# Patient Record
Sex: FEMALE | URBAN - METROPOLITAN AREA
[De-identification: names, ages, dates, MRNs, and addresses within clinical notes are randomized per-mention and may not be internally consistent; named-entity substitution may affect disease eponyms.]

---

## 2024-01-16 ENCOUNTER — INPATIENT (INPATIENT)
Facility: HOSPITAL | Age: 79
LOS: 0 days | Discharge: HOME | DRG: 57 | End: 2024-01-16
Attending: STUDENT IN AN ORGANIZED HEALTH CARE EDUCATION/TRAINING PROGRAM | Admitting: STUDENT IN AN ORGANIZED HEALTH CARE EDUCATION/TRAINING PROGRAM
Payer: SELF-PAY

## 2024-01-16 VITALS
HEART RATE: 68 BPM | RESPIRATION RATE: 15 BRPM | TEMPERATURE: 98 F | SYSTOLIC BLOOD PRESSURE: 145 MMHG | OXYGEN SATURATION: 100 % | DIASTOLIC BLOOD PRESSURE: 78 MMHG

## 2024-01-16 VITALS
HEART RATE: 94 BPM | SYSTOLIC BLOOD PRESSURE: 155 MMHG | DIASTOLIC BLOOD PRESSURE: 89 MMHG | OXYGEN SATURATION: 98 % | TEMPERATURE: 98 F | RESPIRATION RATE: 18 BRPM

## 2024-01-16 DIAGNOSIS — G31.83 NEUROCOGNITIVE DISORDER WITH LEWY BODIES: ICD-10-CM

## 2024-01-16 DIAGNOSIS — R45.851 SUICIDAL IDEATIONS: ICD-10-CM

## 2024-01-16 DIAGNOSIS — Z29.9 ENCOUNTER FOR PROPHYLACTIC MEASURES, UNSPECIFIED: ICD-10-CM

## 2024-01-16 DIAGNOSIS — R45.1 RESTLESSNESS AND AGITATION: ICD-10-CM

## 2024-01-16 LAB
ALBUMIN SERPL ELPH-MCNC: 4.4 G/DL — SIGNIFICANT CHANGE UP (ref 3.3–5)
ALP SERPL-CCNC: 52 U/L — SIGNIFICANT CHANGE UP (ref 40–120)
ALT FLD-CCNC: 26 U/L — SIGNIFICANT CHANGE UP (ref 10–45)
AMPHET UR-MCNC: NEGATIVE — SIGNIFICANT CHANGE UP
ANION GAP SERPL CALC-SCNC: 8 MMOL/L — SIGNIFICANT CHANGE UP (ref 5–17)
APAP SERPL-MCNC: <15 UG/ML — SIGNIFICANT CHANGE UP (ref 10–30)
APPEARANCE UR: ABNORMAL
AST SERPL-CCNC: 29 U/L — SIGNIFICANT CHANGE UP (ref 10–40)
BACTERIA # UR AUTO: NEGATIVE /HPF — SIGNIFICANT CHANGE UP
BARBITURATES UR SCN-MCNC: NEGATIVE — SIGNIFICANT CHANGE UP
BASOPHILS # BLD AUTO: 0.11 K/UL — SIGNIFICANT CHANGE UP (ref 0–0.2)
BASOPHILS NFR BLD AUTO: 1.2 % — SIGNIFICANT CHANGE UP (ref 0–2)
BENZODIAZ UR-MCNC: NEGATIVE — SIGNIFICANT CHANGE UP
BILIRUB SERPL-MCNC: 0.4 MG/DL — SIGNIFICANT CHANGE UP (ref 0.2–1.2)
BILIRUB UR-MCNC: NEGATIVE — SIGNIFICANT CHANGE UP
BUN SERPL-MCNC: 24 MG/DL — HIGH (ref 7–23)
CALCIUM SERPL-MCNC: 9.8 MG/DL — SIGNIFICANT CHANGE UP (ref 8.4–10.5)
CAST: 0 /LPF — SIGNIFICANT CHANGE UP (ref 0–4)
CHLORIDE SERPL-SCNC: 106 MMOL/L — SIGNIFICANT CHANGE UP (ref 96–108)
CO2 SERPL-SCNC: 27 MMOL/L — SIGNIFICANT CHANGE UP (ref 22–31)
COCAINE METAB.OTHER UR-MCNC: NEGATIVE — SIGNIFICANT CHANGE UP
COLOR SPEC: YELLOW — SIGNIFICANT CHANGE UP
CREAT SERPL-MCNC: 0.9 MG/DL — SIGNIFICANT CHANGE UP (ref 0.5–1.3)
DIFF PNL FLD: ABNORMAL
EGFR: 65 ML/MIN/1.73M2 — SIGNIFICANT CHANGE UP
EOSINOPHIL # BLD AUTO: 0.14 K/UL — SIGNIFICANT CHANGE UP (ref 0–0.5)
EOSINOPHIL NFR BLD AUTO: 1.5 % — SIGNIFICANT CHANGE UP (ref 0–6)
ETHANOL SERPL-MCNC: <10 MG/DL — SIGNIFICANT CHANGE UP (ref 0–10)
FLUAV AG NPH QL: SIGNIFICANT CHANGE UP
FLUBV AG NPH QL: SIGNIFICANT CHANGE UP
GLUCOSE SERPL-MCNC: 120 MG/DL — HIGH (ref 70–99)
GLUCOSE UR QL: NEGATIVE MG/DL — SIGNIFICANT CHANGE UP
HCT VFR BLD CALC: 37.6 % — SIGNIFICANT CHANGE UP (ref 34.5–45)
HGB BLD-MCNC: 12.5 G/DL — SIGNIFICANT CHANGE UP (ref 11.5–15.5)
IMM GRANULOCYTES NFR BLD AUTO: 0.4 % — SIGNIFICANT CHANGE UP (ref 0–0.9)
KETONES UR-MCNC: NEGATIVE MG/DL — SIGNIFICANT CHANGE UP
LEUKOCYTE ESTERASE UR-ACNC: ABNORMAL
LYMPHOCYTES # BLD AUTO: 2.44 K/UL — SIGNIFICANT CHANGE UP (ref 1–3.3)
LYMPHOCYTES # BLD AUTO: 26.4 % — SIGNIFICANT CHANGE UP (ref 13–44)
MCHC RBC-ENTMCNC: 31.5 PG — SIGNIFICANT CHANGE UP (ref 27–34)
MCHC RBC-ENTMCNC: 33.2 GM/DL — SIGNIFICANT CHANGE UP (ref 32–36)
MCV RBC AUTO: 94.7 FL — SIGNIFICANT CHANGE UP (ref 80–100)
METHADONE UR-MCNC: NEGATIVE — SIGNIFICANT CHANGE UP
MONOCYTES # BLD AUTO: 0.71 K/UL — SIGNIFICANT CHANGE UP (ref 0–0.9)
MONOCYTES NFR BLD AUTO: 7.7 % — SIGNIFICANT CHANGE UP (ref 2–14)
NEUTROPHILS # BLD AUTO: 5.8 K/UL — SIGNIFICANT CHANGE UP (ref 1.8–7.4)
NEUTROPHILS NFR BLD AUTO: 62.8 % — SIGNIFICANT CHANGE UP (ref 43–77)
NITRITE UR-MCNC: NEGATIVE — SIGNIFICANT CHANGE UP
NRBC # BLD: 0 /100 WBCS — SIGNIFICANT CHANGE UP (ref 0–0)
OPIATES UR-MCNC: NEGATIVE — SIGNIFICANT CHANGE UP
OXYCODONE UR-MCNC: NEGATIVE — SIGNIFICANT CHANGE UP
PCP SPEC-MCNC: SIGNIFICANT CHANGE UP
PCP UR-MCNC: NEGATIVE — SIGNIFICANT CHANGE UP
PH UR: 8 — SIGNIFICANT CHANGE UP (ref 5–8)
PLATELET # BLD AUTO: 306 K/UL — SIGNIFICANT CHANGE UP (ref 150–400)
POTASSIUM SERPL-MCNC: 3.6 MMOL/L — SIGNIFICANT CHANGE UP (ref 3.5–5.3)
POTASSIUM SERPL-SCNC: 3.6 MMOL/L — SIGNIFICANT CHANGE UP (ref 3.5–5.3)
PROT SERPL-MCNC: 6.9 G/DL — SIGNIFICANT CHANGE UP (ref 6–8.3)
PROT UR-MCNC: NEGATIVE MG/DL — SIGNIFICANT CHANGE UP
RBC # BLD: 3.97 M/UL — SIGNIFICANT CHANGE UP (ref 3.8–5.2)
RBC # FLD: 11.9 % — SIGNIFICANT CHANGE UP (ref 10.3–14.5)
RBC CASTS # UR COMP ASSIST: 6 /HPF — HIGH (ref 0–4)
RSV RNA NPH QL NAA+NON-PROBE: SIGNIFICANT CHANGE UP
SALICYLATES SERPL-MCNC: <2 MG/DL — LOW (ref 15–30)
SARS-COV-2 RNA SPEC QL NAA+PROBE: SIGNIFICANT CHANGE UP
SODIUM SERPL-SCNC: 141 MMOL/L — SIGNIFICANT CHANGE UP (ref 135–145)
SP GR SPEC: 1.01 — SIGNIFICANT CHANGE UP (ref 1–1.03)
SQUAMOUS # UR AUTO: 1 /HPF — SIGNIFICANT CHANGE UP (ref 0–5)
THC UR QL: NEGATIVE — SIGNIFICANT CHANGE UP
UROBILINOGEN FLD QL: 0.2 MG/DL — SIGNIFICANT CHANGE UP (ref 0.2–1)
WBC # BLD: 9.24 K/UL — SIGNIFICANT CHANGE UP (ref 3.8–10.5)
WBC # FLD AUTO: 9.24 K/UL — SIGNIFICANT CHANGE UP (ref 3.8–10.5)
WBC UR QL: 3 /HPF — SIGNIFICANT CHANGE UP (ref 0–5)

## 2024-01-16 PROCEDURE — 99222 1ST HOSP IP/OBS MODERATE 55: CPT

## 2024-01-16 PROCEDURE — 99053 MED SERV 10PM-8AM 24 HR FAC: CPT

## 2024-01-16 PROCEDURE — 85025 COMPLETE CBC W/AUTO DIFF WBC: CPT

## 2024-01-16 PROCEDURE — 87637 SARSCOV2&INF A&B&RSV AMP PRB: CPT

## 2024-01-16 PROCEDURE — 87086 URINE CULTURE/COLONY COUNT: CPT

## 2024-01-16 PROCEDURE — 99223 1ST HOSP IP/OBS HIGH 75: CPT

## 2024-01-16 PROCEDURE — 80053 COMPREHEN METABOLIC PANEL: CPT

## 2024-01-16 PROCEDURE — 99285 EMERGENCY DEPT VISIT HI MDM: CPT

## 2024-01-16 PROCEDURE — 99285 EMERGENCY DEPT VISIT HI MDM: CPT | Mod: 25

## 2024-01-16 PROCEDURE — 80307 DRUG TEST PRSMV CHEM ANLYZR: CPT

## 2024-01-16 PROCEDURE — 81001 URINALYSIS AUTO W/SCOPE: CPT

## 2024-01-16 RX ORDER — ONDANSETRON 8 MG/1
4 TABLET, FILM COATED ORAL EVERY 8 HOURS
Refills: 0 | Status: DISCONTINUED | OUTPATIENT
Start: 2024-01-16 | End: 2024-01-16

## 2024-01-16 RX ORDER — ACETAMINOPHEN 500 MG
650 TABLET ORAL EVERY 6 HOURS
Refills: 0 | Status: DISCONTINUED | OUTPATIENT
Start: 2024-01-16 | End: 2024-01-16

## 2024-01-16 RX ORDER — QUETIAPINE FUMARATE 200 MG/1
1 TABLET, FILM COATED ORAL
Refills: 0 | DISCHARGE

## 2024-01-16 RX ORDER — LANOLIN ALCOHOL/MO/W.PET/CERES
3 CREAM (GRAM) TOPICAL AT BEDTIME
Refills: 0 | Status: DISCONTINUED | OUTPATIENT
Start: 2024-01-16 | End: 2024-01-16

## 2024-01-16 NOTE — H&P ADULT - PROBLEM SELECTOR PLAN 1
constant observation  no apparent underlying medical reasons for psychosis  Psychiatry consulted, follow up recommendations  no contraindications to psychiatric admission from a medical standpoint

## 2024-01-16 NOTE — DISCHARGE NOTE NURSING/CASE MANAGEMENT/SOCIAL WORK - NSDCPEFALRISK_GEN_ALL_CORE
For information on Fall & Injury Prevention, visit: [email or web address] OR  [email or web address] OR  https://www.cdc.gov/steadi/patient.html

## 2024-01-16 NOTE — BH CONSULTATION LIAISON ASSESSMENT NOTE - RISK ASSESSMENT
Patient does not meet criteria for inpatient psychiatry admission.   Daughter does not have acute safety concerns, plans to keep patients away from sharps,  and she prefers mother to be cared for at home.   Patient should follow up with outpatient psych - requires assistance with psych referral.   Social work to follow up with safe discharge planning.  elevated risk 2/2 acute/chronic cognitive impairments, no e/o suicidality or homicidality at this time, does not meet criteria for psychiatric admission
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
## 2024-01-16 NOTE — ED PROVIDER NOTE - DIFFERENTIAL DIAGNOSIS
Differential Diagnosis Ddx includes, however, is not limited to: dementia, delirium, psychiatric d/o, other

## 2024-01-16 NOTE — DISCHARGE NOTE NURSING/CASE MANAGEMENT/SOCIAL WORK - PATIENT PORTAL LINK FT
You can access the FollowMyHealth Patient Portal offered by [facility] by registering at the following website: [email or web address]. By joining [employer]’s FollowMyHealth portal, you will also be able to view your health information using other applications (apps) compatible with our system.

## 2024-01-16 NOTE — DISCHARGE NOTE PROVIDER - NSDCCPCAREPLAN_GEN_ALL_CORE_FT
PRINCIPAL DISCHARGE DIAGNOSIS  Diagnosis: Agitation  Assessment and Plan of Treatment: You were brought in for increased symptoms of agitation and were seen by psychiatry, you have been deemed safe to return home with family. Please continue Seroquel and follow up with [facility] [phone]

## 2024-01-16 NOTE — BH CONSULTATION LIAISON ASSESSMENT NOTE - NSBHCHARTREVIEWVS_PSY_A_CORE FT
Vital Signs Last 24 Hrs  T(C): 36.6 (16 Jan 2024 09:53), Max: 36.6 (16 Jan 2024 03:21)  T(F): 97.9 (16 Jan 2024 09:53), Max: 97.9 (16 Jan 2024 03:21)  HR: 94 (16 Jan 2024 09:53) (94 - 94)  BP: 155/89 (16 Jan 2024 09:53) (155/89 - 155/89)  BP(mean): 111 (16 Jan 2024 09:53) (111 - 111)  RR: 18 (16 Jan 2024 09:53) (18 - 18)  SpO2: 98% (16 Jan 2024 09:53) (98% - 98%)    Parameters below as of 16 Jan 2024 09:53  Patient On (Oxygen Delivery Method): room air

## 2024-01-16 NOTE — BH CONSULTATION LIAISON ASSESSMENT NOTE - NSBHCONSULTRECOMMENDOTHER_PSY_A_CORE FT
1. If pt to remain in hospital, increase Seroquel to 25mg PO daily and 50mg PO qHS - if being d/c, may f/u with own provider for med adjustment.    2. PRN: Seroquel 25mg PO q6h PRN agitation; Haldol 0.5mg IV q6h PRN severe agitation.  Monitor for QTc<500.  Melatonin 3mg PO qHS PRN insomnia.    3. Check: TSH, B12, folate, RPR     4. Minimize use of benzos, opioids, anticholinergics, or other deliriogenic agents when possible.  Maintain sleep wake cycle.  Provide frequent reorientation and redirection.  Family member at bedside if possible. Assess for need for glasses and hearing aid (if applicable).    5. Pt does not meet criteria for psychiatric hospitalization.  Recommend outpt psych f/u at [facility] after d/c- [phone].   [facility] will follow.   1. If pt to remain in hospital, increase Seroquel to 25mg PO daily and 50mg PO qHS - if being d/c, may f/u with own provider for med adjustment.    2. PRN: Seroquel 25mg PO q6h PRN agitation; Haldol 0.5mg IV q6h PRN severe agitation.  Monitor for QTc<500.  Melatonin 3mg PO qHS PRN insomnia.    3. Check: TSH, B12, folate, RPR     4. Minimize use of benzos, opioids, anticholinergics, or other deliriogenic agents when possible.  Maintain sleep wake cycle.  Provide frequent reorientation and redirection.  Family member at bedside if possible. Assess for need for glasses and hearing aid (if applicable).    5. Pt does not meet criteria for psychiatric hospitalization.  Recommend outpt psych f/u at [facility] after d/c- [phone].   [facility] will follow.

## 2024-01-16 NOTE — BH CONSULTATION LIAISON ASSESSMENT NOTE - NSBHATTESTCOMMENTATTENDFT_PSY_A_CORE
78F living with daughter, with PPHx LB dementia, no prior SA or psych admissions, no active substance abuse, with no significant PMH bib EMS for agitation at home, threatening with scissor, psychiatry consulted for management of agitation and to evaluate for SI/HI.  Pt AOx1 on interview, seen via [employer]  ID# [id number].  Pt states she does not recall making SI/Hi statements or gesturing with scissor; states she thinks her family wants her to stay in the hospital and she does not know why.  Denies depression, anxiety, SI/HI or AVH.  Denies KEVON.  Denies access to guns.  Presently calm and in fair behavioral control, although with labile affect.  Collateral from dtr denies c/f suicidality or homicidality.  Dx: Dementia with behavioral disturbances.  Recs: Seroquel adjustment as above if pt staying in hospital, SW for safe d/c planning and referral resources.  Agree with trainee's assessment and plan as above. 78F living with daughter, with PPHx LB dementia, no prior SA or psych admissions, no active substance abuse, with no significant PMH bib EMS for agitation at home, threatening with scissor, psychiatry consulted for management of agitation and to evaluate for SI/HI.  Pt AOx1 on interview, seen via [employer]  ID# [id number].  Pt states she does not recall making SI/Hi statements or gesturing with scissor; states she thinks her family wants her to stay in the hospital and she does not know why.  Denies depression, anxiety, SI/HI or AVH.  Denies KEVON.  Denies access to guns.  Presently calm and in fair behavioral control, although with labile affect.  Collateral from dtr denies c/f suicidality or homicidality.  Dx: Dementia with behavioral disturbances.  Recs: Seroquel adjustment as above if pt staying in hospital, SW for safe d/c planning and referral resources.  Agree with trainee's assessment and plan as above.

## 2024-01-16 NOTE — ED ADULT NURSE NOTE - NSFALLUNIVINTERV_ED_ALL_ED
Call bell, personal items and telephone in reach/Non-slip footwear applied when patient is off stretcher/Physically safe environment - no spills, clutter or unnecessary equipment/Purposeful proactive rounding
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
## 2024-01-16 NOTE — BH CONSULTATION LIAISON ASSESSMENT NOTE - ORIENTATION
does not remember psychotic episode as recalled by daughter  knows she is in the hospital, knows her name

## 2024-01-16 NOTE — ED PROVIDER NOTE - CHIEF COMPLAINT
The patient is a 78y Female complaining of suicidal thoughts. The patient is a 78y Female complaining of agitation
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
## 2024-01-16 NOTE — DISCHARGE NOTE PROVIDER - NSFOLLOWUPCLINICS_GEN_ALL_ED_FT
[facility]  Psychiatry  [address] [place] [address]  Phone: [phone]  Fax:   Follow Up Time: 1 week

## 2024-01-16 NOTE — BH CONSULTATION LIAISON ASSESSMENT NOTE - NSSUICPROTFACT_PSY_ALL_CORE
Responsibility to children, family, or others/Supportive social network of family or friends Responsibility to children, family, or others/Supportive social network of family or friends/Positive therapeutic relationships

## 2024-01-16 NOTE — BH CONSULTATION LIAISON ASSESSMENT NOTE - NSBHCHARTREVIEWLAB_PSY_A_CORE FT
12.5   9.24  )-----------( 306      ( 2024 04:16 )             37.6     01-16    141  |  106  |  24<H>  ----------------------------<  120<H>  3.6   |  27  |  0.90    Ca    9.8      2024 04:16    TPro  6.9  /  Alb  4.4  /  TBili  0.4  /  DBili  x   /  AST  29  /  ALT  26  /  AlkPhos  52  -16    Urinalysis Basic - ( 2024 04:24 )    Color: Yellow / Appearance: Cloudy / S.010 / pH: x  Gluc: x / Ketone: Negative mg/dL  / Bili: Negative / Urobili: 0.2 mg/dL   Blood: x / Protein: Negative mg/dL / Nitrite: Negative   Leuk Esterase: Trace / RBC: 6 /HPF / WBC 3 /HPF   Sq Epi: x / Non Sq Epi: 1 /HPF / Bacteria: Negative /HPF

## 2024-01-16 NOTE — DISCHARGE NOTE PROVIDER - CARE PROVIDER_API CALL
[facility],   [address] [facility] [address]  Phone: [phone]  Fax: (   )    -  Follow Up Time:

## 2024-01-16 NOTE — ED PROVIDER NOTE - CLINICAL SUMMARY MEDICAL DECISION MAKING FREE TEXT BOX
Tiffanie Sanabria DO PGY-3  HPI:   78-year-old female with history of Lewy body dementia, Alzheimer's disease presents to the ED brought in by daughter/EMS because of episode of agitation at home.  Patient reports that patient took her usual 5 mg dose of Seroquel at night and then woke up at 2 AM today patient threatening only holding scissors.  Patient's daughter feels that patient has had increased agitated episodes at home and has had increasing difficulty taking care of her.  Patient was recently treated for UTI with Macrobid, status post antibiotics.    ROS:   Denies fever, chills, chest pain, shortness of breath, abdominal pain, nausea, vomiting, diarrhea, dysuria, hematuria    PHYSICAL EXAM:  CONSTITUTIONAL: Well appearing, awake, alert, oriented to person, place, time/situation and in no apparent distress.  HEAD: Atraumatic, no step offs.  NECK: Supple, no meningismus.   EYES: Clear bilaterally, pupils equal, round and reactive to light.  ENMT: Airway patent, Nasal mucosa clear. Mouth with normal mucosa. Uvula is midline.   CARDIAC: Normal rate, regular rhythm. +S1/S2. No murmurs, rubs or gallops.  RESPIRATORY: Breathing unlabored. Breath sounds clear and equal bilaterally.  ABDOMEN:  Soft, nontender, nondistended. No rebound tenderness or guarding.  FLANK: No CVA tenderness B/L.  NEUROLOGICAL: Alert and oriented, no focal deficits, no motor or sensory deficits. Ambulatory in ER with steady gait.  MSK: No clubbing, cyanosis, or edema.   SKIN: Skin warm and dry.     MDM:   70-year-old female with history of Lewy body dementia presents to the ED with increasing agitation  Differential includes but is not limited to neurocognitive disease progression, hematologic or electrolyte abnormalities, precipitating infection,  Plan to obtain labs, urinalysis, EKG, and reassess.  Given family no longer comfortable taking care of at home patient will likely require admission. Tiffanie Sanabria DO PGY-3  HPI:   78-year-old female with history of Lewy body dementia, Alzheimer's disease presents to the ED brought in by daughter/EMS because of episode of agitation at home.  Patient reports that patient took her usual 5 mg dose of Seroquel at night and then woke up at 2 AM today patient threatening only holding scissors.  Patient's daughter feels that patient has had increased agitated episodes at home and has had increasing difficulty taking care of her.  Patient was recently treated for UTI with Macrobid, status post antibiotics.    ROS:   Denies fever, chills, chest pain, shortness of breath, abdominal pain, nausea, vomiting, diarrhea, dysuria, hematuria    PHYSICAL EXAM:  CONSTITUTIONAL: Well appearing, awake, alert, oriented to person, place, time/situation and in no apparent distress.  HEAD: Atraumatic, no step offs.  NECK: Supple, no meningismus.   EYES: Clear bilaterally, pupils equal, round and reactive to light.  ENMT: Airway patent, Nasal mucosa clear. Mouth with normal mucosa. Uvula is midline.   CARDIAC: Normal rate, regular rhythm. +S1/S2. No murmurs, rubs or gallops.  RESPIRATORY: Breathing unlabored. Breath sounds clear and equal bilaterally.  ABDOMEN:  Soft, nontender, nondistended. No rebound tenderness or guarding.  FLANK: No CVA tenderness B/L.  NEUROLOGICAL: Alert and oriented, no focal deficits, no motor or sensory deficits. Ambulatory in ER with steady gait.  MSK: No clubbing, cyanosis, or edema.   SKIN: Skin warm and dry.     MDM:   70-year-old female with history of Lewy body dementia presents to the ED with increasing agitation  Differential includes but is not limited to neurocognitive disease progression, hematologic or electrolyte abnormalities, precipitating infection,  Plan to obtain labs, urinalysis, EKG, and reassess.  Given family no longer comfortable taking care of at home patient will likely require admission.    JESS Oliver MD: Agree with resident/ACP MDM, assessment and plan as above.

## 2024-01-16 NOTE — BH CONSULTATION LIAISON ASSESSMENT NOTE - CURRENT MEDICATION
MEDICATIONS  (STANDING):    MEDICATIONS  (PRN):  acetaminophen     Tablet .. 650 milliGRAM(s) Oral every 6 hours PRN Temp greater or equal to 38C (100.4F), Mild Pain (1 - 3)  aluminum hydroxide/magnesium hydroxide/simethicone Suspension 30 milliLiter(s) Oral every 4 hours PRN Dyspepsia  melatonin 3 milliGRAM(s) Oral at bedtime PRN Insomnia  ondansetron Injectable 4 milliGRAM(s) IV Push every 8 hours PRN Nausea and/or Vomiting

## 2024-01-16 NOTE — ED ADULT NURSE REASSESSMENT NOTE - NS ED NURSE REASSESS COMMENT FT1
Pt daughter at bedside states mother is acting normal and requesting to go home. ACP Cindy Reyes contacted states she will come see pt in ED for evaluation.

## 2024-01-16 NOTE — DISCHARGE NOTE PROVIDER - PROVIDER TOKENS
FREE:[LAST:[[facility]],PHONE:[phone]],FAX:[(   )    -],ADDRESS:[[address] [place] [address]]]

## 2024-01-16 NOTE — BH CONSULTATION LIAISON ASSESSMENT NOTE - NSBHCHARTREVIEWINVESTIGATE_PSY_A_CORE FT
< from: 12 Lead ECG (01.16.24 @ 03:20) >      Ventricular Rate 70 BPM    Atrial Rate 70 BPM    P-R Interval 202 ms    QRS Duration 84 ms    Q-T Interval 388 ms    QTC Calculation(Bazett) 419 ms    P Axis 7 degrees    R Axis 9 degrees    T Axis 1 degrees    Diagnosis Line NORMAL SINUS RHYTHM  POSSIBLE LEFT ATRIAL ENLARGEMENT  SEPTAL INFARCT , AGE UNDETERMINED  CANNOT RULE OUT INFERIOR INFARCT , AGE UNDETERMINED  ABNORMAL ECG  NO PREVIOUS ECGS AVAILABLE  Confirmed by DAT Hernandez Zlata ([id number]) on 1/16/2024 5:16:17 PM    < end of copied text >     < from: 12 Lead ECG (01.16.24 @ 03:20) >      Ventricular Rate 70 BPM    Atrial Rate 70 BPM    P-R Interval 202 ms    QRS Duration 84 ms    Q-T Interval 388 ms    QTC Calculation(Bazett) 419 ms    P Axis 7 degrees    R Axis 9 degrees    T Axis 1 degrees    Diagnosis Line NORMAL SINUS RHYTHM  POSSIBLE LEFT ATRIAL ENLARGEMENT  SEPTAL INFARCT , AGE UNDETERMINED  CANNOT RULE OUT INFERIOR INFARCT , AGE UNDETERMINED  ABNORMAL ECG  NO PREVIOUS ECGS AVAILABLE  Confirmed by DAT Hernandez Zlata ([id number]) on 1/16/2024 5:16:17 PM    < end of copied text >
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
## 2024-01-16 NOTE — BH CONSULTATION LIAISON ASSESSMENT NOTE - HPI (INCLUDE ILLNESS QUALITY, SEVERITY, DURATION, TIMING, CONTEXT, MODIFYING FACTORS, ASSOCIATED SIGNS AND SYMPTOMS)
This is a 78 year old Chinese/ Faroese woman with PMH of Lewy Body Dementia BIBEMS for agitation. She is domiciled in a private residence with her daughter, son in daughter and 2 grandchildren. According to the patient's daughter, the patient went to bed at her usual time after taking Seroquel 5mg. She awoke at 2am and threatened to hurt her family and herself with a pair of scissors. Patient denies that this happened.     On exam, the patient speaks Cantonese and states she has been very tired and admits to sleeping a lot. She admits to fatigue and bodyaches due to arthritis but denies feeling depressed, denies psychiatric history and denies forgetfulness or insomnia. She states that she only sees her PCP Dr Finley every 3 months for hypertension and diabetes. Denies SI and HI and endorses a good relationship with her family. Denies visual or auditory hallucinations. She believes her daughter wanted her in the hospital for possible Covid but she does not feel sick. Denies respiratory symptoms.      This is a 78 year old Chinese/ Hebrew woman with PMH of Lewy Body Dementia BIBEMS for agitation. She is domiciled in a private residence with her daughter, son in daughter and 2 grandchildren. According to the patient's daughter, the patient went to bed at her usual time after taking Seroquel 5mg. She awoke at 2am and threatened to hurt her family and herself with a pair of scissors. Patient denies that this happened.     On exam, the patient speaks Cantonese and states she has been very tired and admits to sleeping a lot. She admits to fatigue and bodyaches due to arthritis but denies feeling depressed, denies psychiatric history and denies forgetfulness or insomnia. She states that she only sees her PCP Dr Finley every 3 months for hypertension and diabetes. Denies SI and HI and endorses a good relationship with her family. Denies visual or auditory hallucinations. She believes her daughter wanted her in the hospital for possible Covid but she does not feel sick. Denies respiratory symptoms.      This is a 78 year old Chinese/ Spanish woman with PMH of Lewy Body Dementia BIBEMS for agitation. She is domiciled in a private residence with her daughter, son in daughter and 2 grandchildren. According to the patient's daughter, the patient went to bed at her usual time after taking Seroquel 5mg. She awoke at 2am and threatened to hurt her family and herself with a pair of scissors.     On exam, the patient speaks Cantonese and denies that this happened. She states she has been very tired and admits to sleeping a lot. She admits to body aches due to arthritis but denies feeling depressed, denies psychiatric history and denies forgetfulness or insomnia. She states that she only sees her PCP Dr Finley every 3 months for hypertension and diabetes. Denies SI and HI and endorses a good relationship with her family. Denies visual or auditory hallucinations. She believes her daughter wanted her in the hospital for possible Covid but she does not feel sick. Denies respiratory symptoms.      This is a 78 year old Chinese/ Tajik woman with PMH of Lewy Body Dementia BIBEMS for agitation. She is domiciled in a private residence with her daughter, son in daughter and 2 grandchildren. According to the patient's daughter, the patient went to bed at her usual time after taking Seroquel 5mg. She awoke at 2am and threatened to hurt her family and herself with a pair of scissors.     On exam, the patient speaks Cantonese and denies that this happened. She states she has been very tired and admits to sleeping a lot. She admits to body aches due to arthritis but denies feeling depressed, denies psychiatric history and denies forgetfulness or insomnia. She states that she only sees her PCP Dr Finley every 3 months for hypertension and diabetes. Denies SI and HI and endorses a good relationship with her family. Denies visual or auditory hallucinations. She believes her daughter wanted her in the hospital for possible Covid but she does not feel sick. Denies respiratory symptoms.      This is a 78 year old Chinese/ Italian  woman with PMH of Lewy Body Dementia BIBEMS for agitation. She is domiciled in a private residence with her daughter, son in daughter and 2 grandchildren. According to ED note, the patient's daughter states that patient went to bed at her usual time after taking Seroquel 5mg. She awoke at 2am and threatened to hurt her family and herself with a pair of scissors.     On exam, the patient speaks Cantonese and denies that this happened. She states she has been very tired and admits to sleeping a lot. She admits to body aches due to arthritis but denies feeling depressed, denies psychiatric history and denies forgetfulness or insomnia. She states that she only sees her PCP Dr Finley every 3 months for hypertension and diabetes. Denies SI and HI and endorses a good relationship with her family. Denies visual or auditory hallucinations. She believes her daughter wanted her in the hospital for possible Covid but she does not feel sick. Denies respiratory symptoms.      This is a 78 year old Chinese/ Croatian  woman with PMH of Lewy Body Dementia BIBEMS for agitation. She is domiciled in a private residence with her daughter, son in daughter and 2 grandchildren. According to ED note, the patient's daughter states that patient went to bed at her usual time after taking Seroquel 5mg. She awoke at 2am and threatened to hurt her family and herself with a pair of scissors.     On exam, the patient speaks Cantonese and denies that this happened. She states she has been very tired and admits to sleeping a lot. She admits to body aches due to arthritis but denies feeling depressed, denies psychiatric history and denies forgetfulness or insomnia. She states that she only sees her PCP Dr Finley every 3 months for hypertension and diabetes. Denies SI and HI and endorses a good relationship with her family. Denies visual or auditory hallucinations. She believes her daughter wanted her in the hospital for possible Covid but she does not feel sick. Denies respiratory symptoms.      This is a 78 year old Chinese/ Georgian  woman with PMH of Lewy Body Dementia BIBEMS for agitation. She is domiciled in a private residence with her daughter, son in daughter and 2 grandchildren. According to ED note, the patient's daughter states that patient went to bed at her usual time after taking Seroquel 5mg. She awoke at 2am and threatened to hurt her family and herself with a pair of scissors.     On exam, the patient speaks Cantonese and language line assistance was used. Patient denied that she threatened to hurt herself or her family. She states she has been very tired and admits to sleeping a lot. She admits to body aches due to arthritis but denies feeling depressed, denies psychiatric history and denies forgetfulness or insomnia. She states that she only sees her PCP Dr. Finley every 3 months for hypertension and diabetes. Denies SI and HI and endorses a good relationship with her family. Denies visual or auditory hallucinations. She believes her daughter wanted her in the hospital for possible Covid but she does not feel sick. Denies respiratory symptoms.         This is a 78 year old Chinese/ Sinhala  woman with PMH of Lewy Body Dementia BIBEMS for agitation. She is domiciled in a private residence with her daughter, son in daughter and 2 grandchildren. According to ED note, the patient's daughter states that patient went to bed at her usual time after taking Seroquel 5mg. She awoke at 2am and threatened to hurt her family and herself with a pair of scissors.     On exam, the patient speaks Cantonese and language line assistance was used. Patient denied that she threatened to hurt herself or her family. She states she has been very tired and admits to sleeping a lot. She admits to body aches due to arthritis but denies feeling depressed, denies psychiatric history and denies forgetfulness or insomnia. She states that she only sees her PCP Dr. Finley every 3 months for hypertension and diabetes. Denies SI and HI and endorses a good relationship with her family. Denies visual or auditory hallucinations. She believes her daughter wanted her in the hospital for possible Covid but she does not feel sick. Denies respiratory symptoms.         This is a 78 year old Chinese/ South Sudanese  woman with PMH of Lewy Body Dementia BIBEMS for agitation. She is domiciled in a private residence with her daughter, son in daughter and 2 grandchildren. According to ED note, the patient's daughter states that patient went to bed at her usual time after taking Seroquel 5mg. She awoke at 2am and threatened to hurt her family and herself with a pair of scissors.     On exam, the patient speaks Cantonese and language line assistance was used. Patient denied that she threatened to hurt herself or her family. She states she has been very tired and admits to sleeping a lot. She admits to body aches due to arthritis but denies feeling depressed, denies psychiatric history and denies forgetfulness or insomnia. She states that she only sees her PCP every 3 months for hypertension and diabetes. Denies SI and HI and endorses a good relationship with her family. Denies visual or auditory hallucinations. She believes her daughter wanted her in the hospital for possible Covid but she does not feel sick. Denies respiratory symptoms.    Spoke with daughter Emelyn, who states patient was had been living with her sister when she was last admitted to [facility] in [place] for agitation last October. Since her sister is unable to care for their mother, she has since moved in with her. Patient is taking Aricept and Seroquel from outpatient neurologist Dr Maryam Head. She has not been able to find a psychiatrist yet and is on a wait list. Her daughter Emelyn feels that is it best for her mother to be at home and she plans to follow recommendations to keep sharps away from patient.        This is a 78 year old Chinese/ [place] citizen  woman with PMH of Lewy Body Dementia BIBEMS for agitation. She is domiciled in a private residence with her daughter, son in daughter and 2 grandchildren. According to ED note, the patient's daughter states that patient went to bed at her usual time after taking Seroquel 5mg. She awoke at 2am and threatened to hurt her family and herself with a pair of scissors.     On exam, the patient speaks Cantonese and language line assistance was used. Patient denied that she threatened to hurt herself or her family. She states she has been very tired and admits to sleeping a lot. She admits to body aches due to arthritis but denies feeling depressed, denies psychiatric history and denies forgetfulness or insomnia. She states that she only sees her PCP every 3 months for hypertension and diabetes. Denies SI and HI and endorses a good relationship with her family. Denies visual or auditory hallucinations. She believes her daughter wanted her in the hospital for possible Covid but she does not feel sick. Denies respiratory symptoms.    Spoke with daughter Emelyn, who states patient was had been living with her sister when she was last admitted to [facility] in [place] for agitation last October. Since her sister is unable to care for their mother, she has since moved in with her. Patient is taking Aricept and Seroquel from outpatient neurologist Dr Maryam Head. She has not been able to find a psychiatrist yet and is on a wait list. Her daughter Emelyn feels that is it best for her mother to be at home and she plans to follow recommendations to keep sharps away from patient.        This is a 78 year old Chinese/ Singaporean  woman with PMH of Lewy Body Dementia BIBEMS for agitation. She is domiciled in a private residence with her daughter, son in daughter and 2 grandchildren. According to ED note, the patient's daughter states that patient went to bed at her usual time after taking Seroquel 50mg. She awoke at 2am and threatened to hurt her family and herself with a pair of scissors.     On exam, the patient speaks Cantonese and language line assistance was used. Oriented to self and being in a hospital.  States she is unsure why she is here.  Patient denied that she threatened to hurt herself or her family. She states she has been very tired and admits to sleeping a lot. She admits to body aches due to arthritis but denies feeling depressed, denies psychiatric history and denies forgetfulness or insomnia. She states that she only sees her PCP every 3 months for hypertension and diabetes. Denies SI and HI and endorses a good relationship with her family. Denies visual or auditory hallucinations. She believes her daughter wanted her in the hospital for possible Covid but she does not feel sick. Denies respiratory symptoms.    Spoke with daughter Emelyn, who states patient was had been living with her sister when she was last admitted to [facility] in [place] for agitation last October. Since her sister is unable to care for their mother, she has since moved in with her. Patient is taking Aricept and Seroquel from outpatient neurologist Dr Maryam Head. She has not been able to find a psychiatrist yet and is on a wait list. Her daughter Emelyn feels that is it best for her mother to be at home and she plans to follow recommendations from their neurologist to keep sharps away from patient. Denies concerns for suicidality/homicidality.       This is a 78 year old Chinese/ Tajik  woman with PMH of Lewy Body Dementia BIBEMS for agitation. She is domiciled in a private residence with her daughter, son in daughter and 2 grandchildren. According to ED note, the patient's daughter states that patient went to bed at her usual time after taking Seroquel 50mg. She awoke at 2am and threatened to hurt her family and herself with a pair of scissors.     On exam, the patient speaks Cantonese and language line assistance was used. Oriented to self and being in a hospital.  States she is unsure why she is here.  Patient denied that she threatened to hurt herself or her family. She states she has been very tired and admits to sleeping a lot. She admits to body aches due to arthritis but denies feeling depressed, denies psychiatric history and denies forgetfulness or insomnia. She states that she only sees her PCP every 3 months for hypertension and diabetes. Denies SI and HI and endorses a good relationship with her family. Denies visual or auditory hallucinations. She believes her daughter wanted her in the hospital for possible Covid but she does not feel sick. Denies respiratory symptoms.    Spoke with daughter Emelyn, who states patient was had been living with her sister when she was last admitted to [facility] in [place] for agitation last October. Since her sister is unable to care for their mother, she has since moved in with her. Patient is taking Aricept and Seroquel from outpatient neurologist Dr Maryam Head. She has not been able to find a psychiatrist yet and is on a wait list. Her daughter Emelyn feels that is it best for her mother to be at home and she plans to follow recommendations from their neurologist to keep sharps away from patient. Denies concerns for suicidality/homicidality.

## 2024-01-17 LAB
CULTURE RESULTS: SIGNIFICANT CHANGE UP
SPECIMEN SOURCE: SIGNIFICANT CHANGE UP

## 2024-12-06 NOTE — DISCHARGE NOTE NURSING/CASE MANAGEMENT/SOCIAL WORK - NSDCPETBCESMAN_GEN_ALL_CORE
If you are a smoker, it is important for your health to stop smoking. Please be aware that second hand smoke is also harmful.
[id number]
